# Patient Record
Sex: FEMALE
[De-identification: names, ages, dates, MRNs, and addresses within clinical notes are randomized per-mention and may not be internally consistent; named-entity substitution may affect disease eponyms.]

---

## 2013-07-29 RX ORDER — SPIRONOLACTONE 25 MG/1
TABLET, FILM COATED ORAL
Qty: 60 | Refills: 2 | Status: DISCONTINUED
Start: 2013-07-29 | End: 2016-01-04

## 2020-02-28 ENCOUNTER — RX ONLY (RX ONLY)
Age: 45
End: 2020-02-28

## 2020-02-28 ENCOUNTER — OTHER (OUTPATIENT)
Dept: URBAN - METROPOLITAN AREA CLINIC 18 | Facility: CLINIC | Age: 45
Setting detail: DERMATOLOGY
End: 2020-02-28

## 2020-02-28 DIAGNOSIS — L81.4 OTHER MELANIN HYPERPIGMENTATION: ICD-10-CM

## 2020-02-28 DIAGNOSIS — D18.01 HEMANGIOMA OF SKIN AND SUBCUTANEOUS TISSUE: ICD-10-CM

## 2020-02-28 DIAGNOSIS — L82.1 OTHER SEBORRHEIC KERATOSIS: ICD-10-CM

## 2020-02-28 PROCEDURE — 99203 OFFICE O/P NEW LOW 30 MIN: CPT

## 2020-02-28 RX ORDER — DOXYCYCLINE HYCLATE 50 MG/1
1 TABLET TABLET, FILM COATED ORAL SINGLE DOSE
Qty: 30 | Refills: 2
Start: 2020-02-28

## 2020-02-28 RX ORDER — OXYMETAZOLINE HYDROCHLORIDE 1 G/100G
1 A SMALL AMOUNT CREAM TOPICAL EVERY MORNING
Qty: 1 | Refills: 2
Start: 2020-02-28

## 2021-04-29 ENCOUNTER — OTHER (OUTPATIENT)
Dept: URBAN - METROPOLITAN AREA CLINIC 18 | Facility: CLINIC | Age: 46
Setting detail: DERMATOLOGY
End: 2021-04-29

## 2021-04-29 ENCOUNTER — RX ONLY (RX ONLY)
Age: 46
End: 2021-04-29

## 2021-04-29 DIAGNOSIS — Z48.02 ENCOUNTER FOR REMOVAL OF SUTURES: ICD-10-CM

## 2021-04-29 PROCEDURE — 99213 OFFICE O/P EST LOW 20 MIN: CPT

## 2021-04-29 RX ORDER — MUPIROCIN 20 MG/G
A SMALL AMOUNT OINTMENT TOPICAL TWICE A DAY
Qty: 30 | Refills: 1
Start: 2021-04-29

## 2021-04-29 RX ORDER — DOXYCYCLINE HYCLATE 100 MG/1
1 CAPSULE CAPSULE, GELATIN COATED ORAL TWICE A DAY
Qty: 20 | Refills: 3
Start: 2021-04-29

## 2021-05-13 ENCOUNTER — OTHER (OUTPATIENT)
Dept: URBAN - METROPOLITAN AREA CLINIC 18 | Facility: CLINIC | Age: 46
Setting detail: DERMATOLOGY
End: 2021-05-13

## 2021-05-13 ENCOUNTER — RX ONLY (RX ONLY)
Age: 46
End: 2021-05-13

## 2021-05-13 DIAGNOSIS — L21.8 OTHER SEBORRHEIC DERMATITIS: ICD-10-CM

## 2021-05-13 PROCEDURE — 11401 EXC TR-EXT B9+MARG 0.6-1 CM: CPT

## 2021-05-13 PROCEDURE — 99214 OFFICE O/P EST MOD 30 MIN: CPT

## 2021-05-13 RX ORDER — DOXYCYCLINE 40 MG/1
1 CAPSULE CAPSULE ORAL ONCE A DAY
Qty: 30 | Refills: 6
Start: 2021-05-13

## 2021-05-25 ENCOUNTER — OTHER (OUTPATIENT)
Dept: URBAN - METROPOLITAN AREA CLINIC 18 | Facility: CLINIC | Age: 46
Setting detail: DERMATOLOGY
End: 2021-05-25

## 2021-05-25 DIAGNOSIS — C44.712 BASAL CELL CARCINOMA OF SKIN OF RIGHT LOWER LIMB, INCLUDING HIP: ICD-10-CM

## 2021-05-25 PROCEDURE — 99024 POSTOP FOLLOW-UP VISIT: CPT

## 2022-03-14 ENCOUNTER — RX ONLY (RX ONLY)
Age: 47
End: 2022-03-14

## 2022-03-14 ENCOUNTER — SPOT (OUTPATIENT)
Dept: URBAN - METROPOLITAN AREA CLINIC 18 | Facility: CLINIC | Age: 47
Setting detail: DERMATOLOGY
End: 2022-03-14

## 2022-03-14 DIAGNOSIS — C44.619 BASAL CELL CARCINOMA OF SKIN OF LEFT UPPER LIMB, INCLUDING SHOULDER: ICD-10-CM

## 2022-03-14 PROCEDURE — 17110 DESTRUCT B9 LESION 1-14: CPT

## 2022-03-14 PROCEDURE — 11102 TANGNTL BX SKIN SINGLE LES: CPT

## 2022-03-14 RX ORDER — METRONIDAZOLE 10 MG/G
A SMALL AMOUNT GEL TOPICAL ONCE A DAY
Qty: 60 | Refills: 1
Start: 2022-03-14

## 2022-05-03 ENCOUNTER — APPOINTMENT (OUTPATIENT)
Dept: URBAN - METROPOLITAN AREA SURGERY 11 | Age: 47
Setting detail: DERMATOLOGY
End: 2022-05-03

## 2022-05-03 DIAGNOSIS — L57.0 ACTINIC KERATOSIS: ICD-10-CM

## 2022-05-03 PROCEDURE — OTHER LIQUID NITROGEN: OTHER

## 2022-05-03 PROCEDURE — 17000 DESTRUCT PREMALG LESION: CPT

## 2022-05-03 ASSESSMENT — LOCATION ZONE DERM: LOCATION ZONE: TRUNK

## 2022-05-03 ASSESSMENT — LOCATION DETAILED DESCRIPTION DERM: LOCATION DETAILED: MIDDLE STERNUM

## 2022-05-03 ASSESSMENT — LOCATION SIMPLE DESCRIPTION DERM: LOCATION SIMPLE: CHEST

## 2023-02-07 ENCOUNTER — APPOINTMENT (OUTPATIENT)
Dept: URBAN - METROPOLITAN AREA SURGERY 11 | Age: 48
Setting detail: DERMATOLOGY
End: 2023-02-10

## 2023-02-07 DIAGNOSIS — D18.0 HEMANGIOMA: ICD-10-CM

## 2023-02-07 DIAGNOSIS — L81.4 OTHER MELANIN HYPERPIGMENTATION: ICD-10-CM

## 2023-02-07 DIAGNOSIS — L82.1 OTHER SEBORRHEIC KERATOSIS: ICD-10-CM

## 2023-02-07 DIAGNOSIS — L71.8 OTHER ROSACEA: ICD-10-CM

## 2023-02-07 DIAGNOSIS — D22 MELANOCYTIC NEVI: ICD-10-CM

## 2023-02-07 PROBLEM — D22.5 MELANOCYTIC NEVI OF TRUNK: Status: ACTIVE | Noted: 2023-02-07

## 2023-02-07 PROBLEM — D18.01 HEMANGIOMA OF SKIN AND SUBCUTANEOUS TISSUE: Status: ACTIVE | Noted: 2023-02-07

## 2023-02-07 PROCEDURE — 99213 OFFICE O/P EST LOW 20 MIN: CPT

## 2023-02-07 PROCEDURE — OTHER MIPS QUALITY: OTHER

## 2023-02-07 PROCEDURE — OTHER PRESCRIPTION: OTHER

## 2023-02-07 PROCEDURE — OTHER COUNSELING: OTHER

## 2023-02-07 RX ORDER — DOXYCYCLINE 40 MG/1
CAPSULE ORAL
Qty: 30 | Refills: 6 | Status: ERX | COMMUNITY
Start: 2023-02-07

## 2023-02-07 RX ORDER — PHARMACY COMPOUNDING ACCESSORY
EACH MISCELLANEOUS
Qty: 30 | Refills: 6 | Status: ERX | COMMUNITY
Start: 2023-02-07

## 2023-02-07 ASSESSMENT — LOCATION DETAILED DESCRIPTION DERM
LOCATION DETAILED: LEFT MEDIAL SUPERIOR CHEST
LOCATION DETAILED: RIGHT CENTRAL MALAR CHEEK
LOCATION DETAILED: RIGHT MEDIAL MALAR CHEEK
LOCATION DETAILED: RIGHT LATERAL SUPERIOR CHEST
LOCATION DETAILED: MIDDLE STERNUM
LOCATION DETAILED: RIGHT MEDIAL SUPERIOR CHEST

## 2023-02-07 ASSESSMENT — LOCATION SIMPLE DESCRIPTION DERM
LOCATION SIMPLE: RIGHT CHEEK
LOCATION SIMPLE: CHEST

## 2023-02-07 ASSESSMENT — LOCATION ZONE DERM
LOCATION ZONE: FACE
LOCATION ZONE: TRUNK

## 2024-02-27 ENCOUNTER — APPOINTMENT (OUTPATIENT)
Dept: URBAN - METROPOLITAN AREA SURGERY 11 | Age: 49
Setting detail: DERMATOLOGY
End: 2024-02-27

## 2024-02-27 DIAGNOSIS — Z41.9 ENCOUNTER FOR PROCEDURE FOR PURPOSES OTHER THAN REMEDYING HEALTH STATE, UNSPECIFIED: ICD-10-CM

## 2024-02-27 DIAGNOSIS — L71.8 OTHER ROSACEA: ICD-10-CM

## 2024-02-27 PROCEDURE — OTHER COUNSELING: OTHER

## 2024-02-27 PROCEDURE — 99214 OFFICE O/P EST MOD 30 MIN: CPT

## 2024-02-27 PROCEDURE — OTHER PRESCRIPTION: OTHER

## 2024-02-27 PROCEDURE — OTHER COSMETIC CONSULTATION: AGING FACE: OTHER

## 2024-02-27 RX ORDER — DOXYCYCLINE 40 MG/1
CAPSULE ORAL QD
Qty: 30 | Refills: 11 | Status: ERX

## 2024-02-27 NOTE — PROCEDURE: COSMETIC CONSULTATION: AGING FACE
Detail Level: Detailed
Patient Specific Counseling (Will Not Stick From Patient To Patient): Disc Botox to DAOs, 8U per side. Disc Restylane Defyne 1 syringe to elaine.